# Patient Record
Sex: MALE | Race: AMERICAN INDIAN OR ALASKA NATIVE
[De-identification: names, ages, dates, MRNs, and addresses within clinical notes are randomized per-mention and may not be internally consistent; named-entity substitution may affect disease eponyms.]

---

## 2022-07-27 ENCOUNTER — HOSPITAL ENCOUNTER (EMERGENCY)
Dept: HOSPITAL 5 - ED | Age: 42
End: 2022-07-27
Payer: MEDICARE

## 2022-07-27 DIAGNOSIS — I46.9: Primary | ICD-10-CM

## 2022-07-27 DIAGNOSIS — E11.9: ICD-10-CM

## 2022-07-27 PROCEDURE — 92950 HEART/LUNG RESUSCITATION CPR: CPT

## 2022-07-27 PROCEDURE — 31500 INSERT EMERGENCY AIRWAY: CPT

## 2022-07-27 PROCEDURE — 99285 EMERGENCY DEPT VISIT HI MDM: CPT

## 2022-07-27 NOTE — EMERGENCY DEPARTMENT REPORT
HPI





- General


Time Seen by Provider: 22 07:16





- HPI


HPI: 





Room 20





The patient is a 42-year-old male present with a chief complaint of cardiac 

arrest.  Per EMS family found the patient unresponsive this morning.  Patient is

bedbound from [facility] 6 months ago.  EMS arrived on scene at 06:30 find the patient 

in asystole.  ACLS protocols were initiated and the patient was intubated with a

Combitube.  2 rounds of epi and 1 amp of sodium bicarb was administered via IO 

prior to arrival.  Upon arrival to the ED the Combitube was removed by myself 

and the patient was intubated using the glide scope.  ACLS protocols were 

continued but there was no return of spontaneous circulation





ED Past Medical Hx





- Past Medical History


Hx Diabetes: Yes





- Surgical History


Additional Surgical History: Midline abdominal scar





- Family History


Family history: no significant





- Social History


Smoking Status: Unknown if ever smoked





- Medications


Home Medications: 


                                Home Medications











 Medication  Instructions  Recorded  Confirmed  Last Taken  Type


 


Enoxaparin 30 mg SQ Q12HR 22 Unknown History


 


Escitalopram [Lexapro Oral Liqd] 10 mg PO QDAY #1 bottle 22  Unknown Rx


 


Esomeprazole Magnesium [NexIUM] 20 mg PO QDAY 22 Unknown History


 


Ipratropium/Albuterol Sulfate 1 ampul IH Q6HR 22 Unknown History





[DUONEB *Not for PRN Use*]     


 


Melatonin [Melatonin 3MG TAB] 3 mg PO HS 22 Unknown History


 


QUEtiapine [SEROquel] 150 mg PO BID #90 tab 22  Unknown Rx


 


Sennosides [Senna] 17.2 mg PO QHS 22 Unknown History


 


clonazePAM [ Klonopin] 0.25 mg PO BID PRN #30 tab 22  Unknown Rx


 


Ascorbic Acid [Vitamin C with Kenzie 500 mg PO BID #60 tab 22  Unknown Rx





Hips]     


 


Bisacodyl [Laxative Suppository] 10 mg VA QHS 30 Days 22  Unknown Rx


 


Cholecalciferol (Vitamin D3) 50,000 unit PO QWEEK #4 cap 22  Unknown Rx





[Vitamin D3 50,000UNIT CAP]     


 


Escitalopram [Lexapro] 10 mg PO QDAY  oral.liqd 22  Unknown Rx


 


Gabapentin 300 mg PO BID #60 cap 22  Unknown Rx


 


QUEtiapine [SEROquel] 50 mg PO BID #60 tablet 22  Unknown Rx


 


QUEtiapine [SEROquel] 100 mg PO BID #60 tablet 22  Unknown Rx


 


carvediloL [Coreg] 6.25 mg PO BID #60 tab 22  Unknown Rx


 


clonazePAM [KlonoPIN] 0.25 mg PO BID PRN  tablet 22  Unknown Rx


 


polyethylene glycoL 3350 [Miralax 17 gm PO QDAY 30 Days 22  Unknown Rx





3350]     














ED Review of Systems


ROS: 


Stated complaint: CARDIAC ARREST


Other details as noted in HPI





Comment: Unobtainable due to pts medical conditions





Physical Exam





- Physical Exam


Physical Exam: 





GENERAL: The patient is well-developed well-nourished male lying on stretcher 

being bagged via Combitube and receiving chest compressions. []


HEENT: Normocephalic.  Atraumatic. 


NECK: Supple.  Trachea midline


CHEST/LUNGS: Clear to auscultation.  There is no respiratory distress noted.


HEART/CARDIOVASCULAR: Regular.  There is no tachycardia.  There is no gallop rub

 or murmur.


ABDOMEN: Abdomen is soft, nontender.  Patient has normal bowel sounds.  There is

 no abdominal distention.


SKIN: There is no rash.  There is no edema.  There is no diaphoresis.


NEURO: GCS 3 T


MUSCULOSKELETAL:  There is no evidence of acute injury.





- Intubation


Time Out Performed: No


Laryngoscope: fiberoptic video scope


Size: 3


Assist Device Used: fiberoptic device


ET Tube Size: 8


Tube Secured Depth (cm): 24


Tube Secured Location: lips


Tube Placement Confirmation: visualized tube passing t, equal breath sounds 

bilat, no breath sounds over epi, confirmation by capnometr


Patient Tolerated Procedure: no complications


Intubation Complications: none





ED Medical Decision Making





- Differential Diagnosis


Cardiac arrest


Critical care attestation.: 


If time is entered above; I have spent that time in minutes in the direct care 

of this critically ill patient, excluding procedure time.








ED Disposition


Clinical Impression: 


 Cardiac arrest





Disposition: 20 


Is pt being admited?: No


Does the pt Need Aspirin: No


Condition: Poor


Time of Disposition: 07:17 (Patient )